# Patient Record
Sex: FEMALE | Race: BLACK OR AFRICAN AMERICAN | Employment: UNEMPLOYED | URBAN - METROPOLITAN AREA
[De-identification: names, ages, dates, MRNs, and addresses within clinical notes are randomized per-mention and may not be internally consistent; named-entity substitution may affect disease eponyms.]

---

## 2021-05-16 ENCOUNTER — HOSPITAL ENCOUNTER (EMERGENCY)
Age: 21
Discharge: HOME | End: 2021-05-16
Attending: STUDENT IN AN ORGANIZED HEALTH CARE EDUCATION/TRAINING PROGRAM
Payer: COMMERCIAL

## 2021-05-16 ENCOUNTER — APPOINTMENT (OUTPATIENT)
Dept: CT IMAGING | Age: 21
End: 2021-05-16
Attending: STUDENT IN AN ORGANIZED HEALTH CARE EDUCATION/TRAINING PROGRAM
Payer: COMMERCIAL

## 2021-05-16 VITALS
HEIGHT: 62 IN | DIASTOLIC BLOOD PRESSURE: 73 MMHG | HEART RATE: 107 BPM | WEIGHT: 150 LBS | BODY MASS INDEX: 27.6 KG/M2 | OXYGEN SATURATION: 100 % | RESPIRATION RATE: 18 BRPM | SYSTOLIC BLOOD PRESSURE: 120 MMHG | TEMPERATURE: 97.3 F

## 2021-05-16 DIAGNOSIS — K61.0 PERIANAL ABSCESS: ICD-10-CM

## 2021-05-16 DIAGNOSIS — E87.6 HYPOKALEMIA: Primary | ICD-10-CM

## 2021-05-16 LAB
ANION GAP SERPL CALC-SCNC: 6 MMOL/L (ref 3–18)
BASOPHILS # BLD: 0 K/UL (ref 0–0.1)
BASOPHILS NFR BLD: 0 % (ref 0–2)
BUN SERPL-MCNC: 12 MG/DL (ref 7–18)
BUN/CREAT SERPL: 17 (ref 12–20)
CALCIUM SERPL-MCNC: 9 MG/DL (ref 8.5–10.1)
CHLORIDE SERPL-SCNC: 108 MMOL/L (ref 100–111)
CO2 SERPL-SCNC: 26 MMOL/L (ref 21–32)
CREAT SERPL-MCNC: 0.71 MG/DL (ref 0.6–1.3)
DIFFERENTIAL METHOD BLD: ABNORMAL
EOSINOPHIL # BLD: 0.1 K/UL (ref 0–0.4)
EOSINOPHIL NFR BLD: 1 % (ref 0–5)
ERYTHROCYTE [DISTWIDTH] IN BLOOD BY AUTOMATED COUNT: 13.9 % (ref 11.6–14.5)
GLUCOSE SERPL-MCNC: 93 MG/DL (ref 74–99)
HCG SERPL QL: NEGATIVE
HCT VFR BLD AUTO: 41.5 % (ref 35–45)
HGB BLD-MCNC: 12.5 G/DL (ref 12–16)
LYMPHOCYTES # BLD: 2.3 K/UL (ref 0.9–3.6)
LYMPHOCYTES NFR BLD: 22 % (ref 21–52)
MCH RBC QN AUTO: 23.4 PG (ref 24–34)
MCHC RBC AUTO-ENTMCNC: 30.1 G/DL (ref 31–37)
MCV RBC AUTO: 77.7 FL (ref 74–97)
MONOCYTES # BLD: 0.8 K/UL (ref 0.05–1.2)
MONOCYTES NFR BLD: 8 % (ref 3–10)
NEUTS SEG # BLD: 7.1 K/UL (ref 1.8–8)
NEUTS SEG NFR BLD: 69 % (ref 40–73)
PLATELET # BLD AUTO: 361 K/UL (ref 135–420)
PMV BLD AUTO: 10.2 FL (ref 9.2–11.8)
POTASSIUM SERPL-SCNC: 3.3 MMOL/L (ref 3.5–5.5)
RBC # BLD AUTO: 5.34 M/UL (ref 4.2–5.3)
SODIUM SERPL-SCNC: 140 MMOL/L (ref 136–145)
WBC # BLD AUTO: 10.3 K/UL (ref 4.6–13.2)

## 2021-05-16 PROCEDURE — 99283 EMERGENCY DEPT VISIT LOW MDM: CPT

## 2021-05-16 PROCEDURE — 85025 COMPLETE CBC W/AUTO DIFF WBC: CPT

## 2021-05-16 PROCEDURE — 74011250637 HC RX REV CODE- 250/637: Performed by: STUDENT IN AN ORGANIZED HEALTH CARE EDUCATION/TRAINING PROGRAM

## 2021-05-16 PROCEDURE — 74177 CT ABD & PELVIS W/CONTRAST: CPT

## 2021-05-16 PROCEDURE — 74011000636 HC RX REV CODE- 636: Performed by: STUDENT IN AN ORGANIZED HEALTH CARE EDUCATION/TRAINING PROGRAM

## 2021-05-16 PROCEDURE — 84703 CHORIONIC GONADOTROPIN ASSAY: CPT

## 2021-05-16 PROCEDURE — 80048 BASIC METABOLIC PNL TOTAL CA: CPT

## 2021-05-16 PROCEDURE — 74011250636 HC RX REV CODE- 250/636: Performed by: STUDENT IN AN ORGANIZED HEALTH CARE EDUCATION/TRAINING PROGRAM

## 2021-05-16 RX ORDER — ONDANSETRON 2 MG/ML
4 INJECTION INTRAMUSCULAR; INTRAVENOUS
Status: DISCONTINUED | OUTPATIENT
Start: 2021-05-16 | End: 2021-05-16 | Stop reason: HOSPADM

## 2021-05-16 RX ORDER — POTASSIUM CHLORIDE 20 MEQ/1
40 TABLET, EXTENDED RELEASE ORAL
Status: COMPLETED | OUTPATIENT
Start: 2021-05-16 | End: 2021-05-16

## 2021-05-16 RX ORDER — HYDROCODONE BITARTRATE AND ACETAMINOPHEN 5; 325 MG/1; MG/1
1 TABLET ORAL
Qty: 12 TAB | Refills: 0 | Status: SHIPPED | OUTPATIENT
Start: 2021-05-16 | End: 2021-05-19

## 2021-05-16 RX ORDER — CIPROFLOXACIN 2 MG/ML
400 INJECTION, SOLUTION INTRAVENOUS ONCE
Status: DISCONTINUED | OUTPATIENT
Start: 2021-05-16 | End: 2021-05-16

## 2021-05-16 RX ORDER — METRONIDAZOLE 500 MG/100ML
500 INJECTION, SOLUTION INTRAVENOUS ONCE
Status: DISCONTINUED | OUTPATIENT
Start: 2021-05-16 | End: 2021-05-16

## 2021-05-16 RX ORDER — AMOXICILLIN AND CLAVULANATE POTASSIUM 875; 125 MG/1; MG/1
1 TABLET, FILM COATED ORAL 2 TIMES DAILY
Qty: 20 TAB | Refills: 0 | Status: SHIPPED | OUTPATIENT
Start: 2021-05-16 | End: 2021-05-16 | Stop reason: SDUPTHER

## 2021-05-16 RX ORDER — DOCUSATE SODIUM 100 MG/1
100 CAPSULE, LIQUID FILLED ORAL DAILY
Qty: 30 CAP | Refills: 0 | Status: SHIPPED | OUTPATIENT
Start: 2021-05-16 | End: 2021-06-15

## 2021-05-16 RX ORDER — AMOXICILLIN AND CLAVULANATE POTASSIUM 875; 125 MG/1; MG/1
1 TABLET, FILM COATED ORAL 2 TIMES DAILY
Qty: 20 TAB | Refills: 0 | Status: SHIPPED | OUTPATIENT
Start: 2021-05-16 | End: 2021-05-26

## 2021-05-16 RX ORDER — FENTANYL CITRATE 50 UG/ML
25 INJECTION, SOLUTION INTRAMUSCULAR; INTRAVENOUS
Status: DISCONTINUED | OUTPATIENT
Start: 2021-05-16 | End: 2021-05-16

## 2021-05-16 RX ADMIN — POTASSIUM CHLORIDE 40 MEQ: 1500 TABLET, EXTENDED RELEASE ORAL at 06:35

## 2021-05-16 RX ADMIN — SODIUM CHLORIDE, POTASSIUM CHLORIDE, SODIUM LACTATE AND CALCIUM CHLORIDE 1000 ML: 600; 310; 30; 20 INJECTION, SOLUTION INTRAVENOUS at 05:15

## 2021-05-16 RX ADMIN — IOPAMIDOL 100 ML: 612 INJECTION, SOLUTION INTRAVENOUS at 06:15

## 2021-05-16 NOTE — ED TRIAGE NOTES
Patient arrives ambulatory to ED with c/c of an abscess close to her rectum, onset 1 week. She describes it as very painful, red and ready to be drained. Patient reports this is the 3rd time she has had this same abscess.

## 2021-05-16 NOTE — ED PROVIDER NOTES
EMERGENCY DEPARTMENT HISTORY AND PHYSICAL EXAM      Date: 5/16/2021  Patient Name: China Hall    History of Presenting Illness     Chief Complaint   Patient presents with    Skin Problem       History Provided By: Patient    HPI:  China Hall is a 21 y.o. female with history of perianal abscesses who complains of buttock pain. Patient arrives ambulatory to ED with c/c of an abscess close to her rectum, onset 1 week. She describes it as very painful, red and ready to be drained. Patient reports this is the 3rd time she has had this same abscess. The last time she was seen for this it was drained in the ED, greater thank 1 year ago. She denies fever, nausea, vomiting, diarrhea. The patient denies any aggravating or alleviating factors - and has not taken any medications in an attempt to alleviate her symptoms. PMH, PSH, family history, social history, allergies reviewed with the patient with significant items noted above. PCP: Tomeka Reyes MD    Current Facility-Administered Medications   Medication Dose Route Frequency Provider Last Rate Last Admin    ondansetron (ZOFRAN) injection 4 mg  4 mg IntraVENous NOW Shailesh Zamora MD         Current Outpatient Medications   Medication Sig Dispense Refill    amoxicillin-clavulanate (Augmentin) 875-125 mg per tablet Take 1 Tab by mouth two (2) times a day for 10 days. 20 Tab 0    HYDROcodone-acetaminophen (Norco) 5-325 mg per tablet Take 1 Tab by mouth every four (4) hours as needed for Pain for up to 3 days. Max Daily Amount: 6 Tabs. 12 Tab 0    docusate sodium (Colace) 100 mg capsule Take 1 Cap by mouth daily for 30 days. 30 Cap 0       Past History     Past Medical History:  History reviewed. No pertinent past medical history. Past Surgical History:  History reviewed. No pertinent surgical history. Family History:  History reviewed. No pertinent family history.     Social History:  Social History     Tobacco Use    Smoking status: Never Smoker    Smokeless tobacco: Never Used   Substance Use Topics    Alcohol use: Not Currently    Drug use: Never       Allergies:  No Known Allergies    Review of Systems   Review of Systems    In addition to that documented in the HPI above  All other review of systems negative    Constitutional: Denies fevers or chills  Eyes: Denies vision changes  ENMT: Denies sore throat  CV: Denies chest pain  Resp: Denies SOB  GI: Denies vomiting or diarrhea  : Denies painful urination  MSK: Denies recent trauma  Skin: Denies new rashes  Neuro: Denies new numbness or tingling or weakness  Endocrine: Denies polyuria  Heme: Denies bleeding disorders    Physical Exam     Vitals:    05/16/21 0432 05/16/21 0635   BP: (!) 122/48 120/73   Pulse: (!) 107    Resp: 18    Temp: 97.3 °F (36.3 °C)    SpO2: 100% 100%   Weight: 68 kg (150 lb)    Height: 5' 2\" (1.575 m)      Physical Exam    Nursing notes and vital signs reviewed    General: Patient is awake and alert, resting comfortably in no acute distress  Head: Normocephalic and atraumatic  Eyes: Extraocular muscles intact, no conjunctival pallor  Ear, nose, throat: Normal external exam  Neck: Normal range of motion  Cardiovascular: RRR, No murmur auscultated, warm, well-perfused extremities  Respiratory: Patient is in no respiratory distress, lungs CTAB  GI: soft, non-tender, non-distended  MSK: No gross deformities appreciated  Extremities: pulses intact with good cap refills, no LE pitting edema or calf tenderness  Skin: Warm, dry, and intact  Neuro: The patient is alert and oriented, no gross motor or sensory defects noted. Psych: Appropriate mood and affect. Medical Decision Making   I am the first provider for this patient. I reviewed the vital signs, available nursing notes, past medical history, past surgical history, family history and social history. Vital Signs-Reviewed the patient's vital signs.   Visit Vitals  /73   Pulse (!) 107   Temp 97.3 °F (36.3 °C)   Resp 18   Ht 5' 2\" (1.575 m)   Wt 68 kg (150 lb)   LMP 04/19/2021   SpO2 100%   BMI 27.44 kg/m²     Pulse Oximetry Analysis - 100% on room air   Cardiac Monitor:  Rate: 107 bpm  Rhythm: Sinus Tach    Provider Notes (Medical Decision Making):   Marylou Ware is a 21 y.o. female with perianal abscess. Large area of induration and 3-4 cm area of fluctuance. Concern for perirectal location. Procedures:  Procedures    ED Course:   ED Course as of May 16 0749   Sun May 16, 2021   0514 CBC without leukocytosis or anemia. [DM]   7336 Mild hypokalemia on chemistry panel    [DM]   0553 Pregnancy negative    [DM]      ED Course User Index  [DM] Nando Whitney MD      7:31 AM  Patient cannot stay any longer in ED or for day surgery. Discussed with Dr. Joaquin Flores, home with abx, call office tomorrow to schedule. Sitz baths. Diagnostic Study Results     Orders Placed This Encounter    CT ABD PELV W CONT     Standing Status:   Standing     Number of Occurrences:   1     Order Specific Question:   Transport     Answer:   Ambulatory [1]     Order Specific Question:   Is Patient Pregnant? Answer:   Unknown     Order Specific Question:   Reason for Exam     Answer:   perirectal abscess     Order Specific Question: This order utilizes IV contrast.  What additional contrast is needed? Answer:   Per Radiologist Protocol     Order Specific Question:   Does patient have history of Renal Disease?      Answer:   No     Order Specific Question:   Decision Support Exception     Answer:   Emergency Medical Condition (MA) [1]    BASIC METABOLIC PANEL     Standing Status:   Standing     Number of Occurrences:   1    CBC WITH AUTOMATED DIFF     Standing Status:   Standing     Number of Occurrences:   1    HCG QL SERUM     Standing Status:   Standing     Number of Occurrences:   1    lactated ringers bolus infusion 1,000 mL    potassium chloride (K-DUR, KLOR-CON) SR tablet 40 mEq    DISCONTD: fentaNYL citrate (PF) injection 25 mcg    ondansetron (ZOFRAN) injection 4 mg    iopamidoL (ISOVUE 300) 61 % contrast injection  mL    DISCONTD: ciprofloxacin (CIPRO) 400 mg in D5W IVPB (premix)     Order Specific Question:   Antibiotic Indications     Answer:   Skin and Soft Tissue Infection    DISCONTD: metroNIDAZOLE (FLAGYL) IVPB premix 500 mg     Order Specific Question:   Antibiotic Indications     Answer:   Skin and Soft Tissue Infection    DISCONTD: ciprofloxacin (CIPRO) 400 mg in D5W IVPB (premix)     Order Specific Question:   Antibiotic Indications     Answer:   Skin and Soft Tissue Infection    DISCONTD: amoxicillin-clavulanate (Augmentin) 875-125 mg per tablet     Sig: Take 1 Tab by mouth two (2) times a day for 10 days. Dispense:  20 Tab     Refill:  0    amoxicillin-clavulanate (Augmentin) 875-125 mg per tablet     Sig: Take 1 Tab by mouth two (2) times a day for 10 days. Dispense:  20 Tab     Refill:  0    HYDROcodone-acetaminophen (Norco) 5-325 mg per tablet     Sig: Take 1 Tab by mouth every four (4) hours as needed for Pain for up to 3 days. Max Daily Amount: 6 Tabs. Dispense:  12 Tab     Refill:  0    docusate sodium (Colace) 100 mg capsule     Sig: Take 1 Cap by mouth daily for 30 days.      Dispense:  30 Cap     Refill:  0       Labs -     Recent Results (from the past 12 hour(s))   METABOLIC PANEL, BASIC    Collection Time: 05/16/21  5:02 AM   Result Value Ref Range    Sodium 140 136 - 145 mmol/L    Potassium 3.3 (L) 3.5 - 5.5 mmol/L    Chloride 108 100 - 111 mmol/L    CO2 26 21 - 32 mmol/L    Anion gap 6 3.0 - 18 mmol/L    Glucose 93 74 - 99 mg/dL    BUN 12 7.0 - 18 MG/DL    Creatinine 0.71 0.6 - 1.3 MG/DL    BUN/Creatinine ratio 17 12 - 20      GFR est AA >60 >60 ml/min/1.73m2    GFR est non-AA >60 >60 ml/min/1.73m2    Calcium 9.0 8.5 - 10.1 MG/DL   CBC WITH AUTOMATED DIFF    Collection Time: 05/16/21  5:02 AM   Result Value Ref Range    WBC 10.3 4.6 - 13.2 K/uL    RBC 5.34 (H) 4.20 - 5.30 M/uL    HGB 12.5 12.0 - 16.0 g/dL    HCT 41.5 35.0 - 45.0 %    MCV 77.7 74.0 - 97.0 FL    MCH 23.4 (L) 24.0 - 34.0 PG    MCHC 30.1 (L) 31.0 - 37.0 g/dL    RDW 13.9 11.6 - 14.5 %    PLATELET 663 333 - 569 K/uL    MPV 10.2 9.2 - 11.8 FL    NEUTROPHILS 69 40 - 73 %    LYMPHOCYTES 22 21 - 52 %    MONOCYTES 8 3 - 10 %    EOSINOPHILS 1 0 - 5 %    BASOPHILS 0 0 - 2 %    ABS. NEUTROPHILS 7.1 1.8 - 8.0 K/UL    ABS. LYMPHOCYTES 2.3 0.9 - 3.6 K/UL    ABS. MONOCYTES 0.8 0.05 - 1.2 K/UL    ABS. EOSINOPHILS 0.1 0.0 - 0.4 K/UL    ABS. BASOPHILS 0.0 0.0 - 0.1 K/UL    DF AUTOMATED     HCG QL SERUM    Collection Time: 05/16/21  5:02 AM   Result Value Ref Range    HCG, Ql. Negative NEG         Radiologic Studies -   CT ABD PELV W CONT   Final Result      4.2 x 2.4 cm fluid collection in the left medial buttock/perineal region. CT Results  (Last 48 hours)               05/16/21 0635  CT ABD PELV W CONT Final result    Impression:      4.2 x 2.4 cm fluid collection in the left medial buttock/perineal region. Narrative:  EXAM: CT of the abdomen and pelvis       INDICATION: Perirectal abscess pain. COMPARISON: None. TECHNIQUE: Axial CT imaging of the abdomen and pelvis was performed with   intravenous contrast. Multiplanar reformats were generated. One or more dose   reduction techniques were used on this CT: automated exposure control,   adjustment of the mAs and/or kVp according to patient size, and iterative   reconstruction techniques. The specific techniques used on this CT exam have   been documented in the patient's electronic medical record. Digital Imaging and   Communications in Medicine (DICOM) format image data are available to   nonaffiliated external healthcare facilities or entities on a secure, media   free, reciprocally searchable basis with patient authorization for at least a   12-month period after this study.  .       _______________       FINDINGS: LOWER CHEST: Unremarkable. LIVER, BILIARY: Liver is normal. No biliary dilation. Gallbladder is   unremarkable. PANCREAS: Normal.       SPLEEN: Normal.       ADRENALS: Normal.       KIDNEYS/URETERS/BLADDER: Normal.       PELVIC ORGANS: Unremarkable. VASCULATURE: Unremarkable       LYMPH NODES: No enlarged lymph nodes. GASTROINTESTINAL TRACT: No bowel dilation or wall thickening. BONES: No acute or aggressive osseous abnormalities identified. OTHER: Fluid collection in the medial left buttocks/perianal region measuring   4.2 x 2.4 cm with mild adjacent inflammation. .       _______________               CXR Results  (Last 48 hours)    None          Disposition     Disposition:  Home    CLINICAL IMPRESSION:    1. Hypokalemia    2. Perianal abscess        It should be noted that I will be the provider of record for this patient  Ange Patino MD    Follow-up Information     Follow up With Specialties Details Why [address]    THE [facility] EMERGENCY DEPT Emergency Medicine Go to  If symptoms worsen [address] [place]  [phone]    Braydon Kelley MD General Surgery  see any doctor asa soon as possible, [address] [place] [address]  [phone]            Current Discharge Medication List      START taking these medications    Details   amoxicillin-clavulanate (Augmentin) 875-125 mg per tablet Take 1 Tab by mouth two (2) times a day for 10 days. Qty: 20 Tab, Refills: 0  Start date: 5/16/2021, End date: 5/26/2021      HYDROcodone-acetaminophen (Norco) 5-325 mg per tablet Take 1 Tab by mouth every four (4) hours as needed for Pain for up to 3 days. Max Daily Amount: 6 Tabs. Qty: 12 Tab, Refills: 0  Start date: 5/16/2021, End date: 5/19/2021    Associated Diagnoses: Perianal abscess      docusate sodium (Colace) 100 mg capsule Take 1 Cap by mouth daily for 30 days.   Qty: 30 Cap, Refills: 0  Start date: 5/16/2021, End date: 6/15/2021             Please note that this dictation was completed with [email or web address], the computer voice recognition software. Quite often unanticipated grammatical, syntax, homophones, and other interpretive errors are inadvertently transcribed by the computer software. Please disregard these errors. Please excuse any errors that have escaped final proofreading.

## 2021-05-16 NOTE — DISCHARGE INSTRUCTIONS
Please return to the ER with any new or worsening symptoms or any other concerns. Please return to the Emergency Department if you develop a fever, chills, cannot eat or drink due to nausea or vomiting, or if any of your symptoms worsen. Please follow up with PCP and general surgery    Also, It is extremely important that you follow-up with a primary care physician and if you do not have one currently use the contact information provided to obtain an appointment. If none was provided please call the number on the back of your insurance card to locate a Primary care doctor. Many offices have \"cancellation lists\" that you can ask to be placed on; should a patient with an earlier appointment cancel you will be notified to take their place. Please return to the Emergency Room immediately if your symptoms worsen. Please carefully read all discharge instructions    [email or web address]    What are GoodRx coupons? GoodRx coupons will help you pay less than the cash price for your prescription. [facility] [place] free to use and are accepted at virtually every [place] pharmacy. Your pharmacist will know how to enter the codes on the coupon to pull up the lowest discount available.